# Patient Record
Sex: MALE | Race: WHITE | NOT HISPANIC OR LATINO | Employment: UNEMPLOYED | URBAN - NONMETROPOLITAN AREA
[De-identification: names, ages, dates, MRNs, and addresses within clinical notes are randomized per-mention and may not be internally consistent; named-entity substitution may affect disease eponyms.]

---

## 2019-01-29 ENCOUNTER — OFFICE VISIT (OUTPATIENT)
Dept: CARDIOLOGY | Facility: CLINIC | Age: 49
End: 2019-01-29

## 2019-01-29 ENCOUNTER — RESULTS ENCOUNTER (OUTPATIENT)
Dept: CARDIOLOGY | Facility: CLINIC | Age: 49
End: 2019-01-29

## 2019-01-29 VITALS
BODY MASS INDEX: 32.91 KG/M2 | HEIGHT: 69 IN | SYSTOLIC BLOOD PRESSURE: 154 MMHG | WEIGHT: 222.2 LBS | HEART RATE: 100 BPM | DIASTOLIC BLOOD PRESSURE: 82 MMHG | OXYGEN SATURATION: 96 %

## 2019-01-29 DIAGNOSIS — R06.02 SHORTNESS OF BREATH: ICD-10-CM

## 2019-01-29 DIAGNOSIS — I10 ESSENTIAL HYPERTENSION: Primary | ICD-10-CM

## 2019-01-29 DIAGNOSIS — I10 ESSENTIAL HYPERTENSION: ICD-10-CM

## 2019-01-29 DIAGNOSIS — K74.60 CIRRHOSIS OF LIVER WITHOUT ASCITES, UNSPECIFIED HEPATIC CIRRHOSIS TYPE (HCC): ICD-10-CM

## 2019-01-29 DIAGNOSIS — I95.1 SYNCOPE DUE TO ORTHOSTATIC HYPOTENSION: ICD-10-CM

## 2019-01-29 DIAGNOSIS — R53.83 FATIGUE, UNSPECIFIED TYPE: ICD-10-CM

## 2019-01-29 PROBLEM — N17.9 ACUTE RENAL FAILURE (ARF) (HCC): Status: ACTIVE | Noted: 2019-01-29

## 2019-01-29 PROBLEM — F17.200 SMOKER: Status: ACTIVE | Noted: 2019-01-29

## 2019-01-29 PROCEDURE — 99214 OFFICE O/P EST MOD 30 MIN: CPT | Performed by: INTERNAL MEDICINE

## 2019-01-29 PROCEDURE — 93000 ELECTROCARDIOGRAM COMPLETE: CPT | Performed by: INTERNAL MEDICINE

## 2019-01-29 RX ORDER — OXYCODONE HYDROCHLORIDE 15 MG/1
TABLET ORAL
Refills: 0 | COMMUNITY
Start: 2019-01-03 | End: 2021-06-09

## 2019-01-29 RX ORDER — ISOSORBIDE DINITRATE 20 MG/1
20 TABLET ORAL 2 TIMES DAILY
Refills: 3 | COMMUNITY
Start: 2019-01-03 | End: 2021-09-23

## 2019-01-29 RX ORDER — MELOXICAM 15 MG/1
TABLET ORAL DAILY
Refills: 2 | COMMUNITY
Start: 2018-11-14 | End: 2021-06-09

## 2019-01-29 RX ORDER — FOLIC ACID/MV,IRON,MIN/LUTEIN 0.4-18-25
1 TABLET ORAL DAILY
Refills: 5 | COMMUNITY
Start: 2019-01-18 | End: 2021-06-09

## 2019-01-29 RX ORDER — TAMSULOSIN HYDROCHLORIDE 0.4 MG/1
CAPSULE ORAL DAILY
Refills: 3 | COMMUNITY
Start: 2019-01-18

## 2019-01-29 RX ORDER — MYCOPHENOLATE MOFETIL 500 MG/1
TABLET ORAL
Refills: 11 | COMMUNITY
Start: 2019-01-18

## 2019-01-29 RX ORDER — CYCLOSPORINE 100 MG/1
CAPSULE, GELATIN COATED ORAL 2 TIMES DAILY
COMMUNITY

## 2019-01-29 RX ORDER — AMLODIPINE BESYLATE 10 MG/1
10 TABLET ORAL DAILY
Refills: 3 | COMMUNITY
Start: 2019-01-03 | End: 2021-09-23

## 2019-01-29 RX ORDER — DIAZEPAM 5 MG/1
5 TABLET ORAL 2 TIMES DAILY PRN
Refills: 1 | COMMUNITY
Start: 2019-01-03 | End: 2021-06-09

## 2019-01-29 RX ORDER — PANTOPRAZOLE SODIUM 40 MG/1
40 TABLET, DELAYED RELEASE ORAL 2 TIMES DAILY
Refills: 3 | COMMUNITY
Start: 2019-01-02 | End: 2021-06-09

## 2019-01-29 NOTE — PATIENT INSTRUCTIONS
Obesity, Adult  Obesity is the condition of having too much total body fat. Being overweight or obese means that your weight is greater than what is considered healthy for your body size. Obesity is determined by a measurement called BMI. BMI is an estimate of body fat and is calculated from height and weight. For adults, a BMI of 30 or higher is considered obese.  Obesity can eventually lead to other health concerns and major illnesses, including:  · Stroke.  · Coronary artery disease (CAD).  · Type 2 diabetes.  · Some types of cancer, including cancers of the colon, breast, uterus, and gallbladder.  · Osteoarthritis.  · High blood pressure (hypertension).  · High cholesterol.  · Sleep apnea.  · Gallbladder stones.  · Infertility problems.    What are the causes?  The main cause of obesity is taking in (consuming) more calories than your body uses for energy. Other factors that contribute to this condition may include:  · Being born with genes that make you more likely to become obese.  · Having a medical condition that causes obesity. These conditions include:  ? Hypothyroidism.  ? Polycystic ovarian syndrome (PCOS).  ? Binge-eating disorder.  ? Cushing syndrome.  · Taking certain medicines, such as steroids, antidepressants, and seizure medicines.  · Not being physically active (sedentary lifestyle).  · Living where there are limited places to exercise safely or buy healthy foods.  · Not getting enough sleep.    What increases the risk?  The following factors may increase your risk of this condition:  · Having a family history of obesity.  · Being a woman of -American descent.  · Being a man of  descent.    What are the signs or symptoms?  Having excessive body fat is the main symptom of this condition.  How is this diagnosed?  This condition may be diagnosed based on:  · Your symptoms.  · Your medical history.  · A physical exam. Your health care provider may measure:  ? Your BMI. If you are an  adult with a BMI between 25 and less than 30, you are considered overweight. If you are an adult with a BMI of 30 or higher, you are considered obese.  ? The distances around your hips and your waist (circumferences). These may be compared to each other to help diagnose your condition.  ? Your skinfold thickness. Your health care provider may gently pinch a fold of your skin and measure it.    How is this treated?  Treatment for this condition often includes changing your lifestyle. Treatment may include some or all of the following:  · Dietary changes. Work with your health care provider and a dietitian to set a weight-loss goal that is healthy and reasonable for you. Dietary changes may include eating:  ? Smaller portions. A portion size is the amount of a particular food that is healthy for you to eat at one time. This varies from person to person.  ? Low-calorie or low-fat options.  ? More whole grains, fruits, and vegetables.  · Regular physical activity. This may include aerobic activity (cardio) and strength training.  · Medicine to help you lose weight. Your health care provider may prescribe medicine if you are unable to lose 1 pound a week after 6 weeks of eating more healthily and doing more physical activity.  · Surgery. Surgical options may include gastric banding and gastric bypass. Surgery may be done if:  ? Other treatments have not helped to improve your condition.  ? You have a BMI of 40 or higher.  ? You have life-threatening health problems related to obesity.    Follow these instructions at home:    Eating and drinking    · Follow recommendations from your health care provider about what you eat and drink. Your health care provider may advise you to:  ? Limit fast foods, sweets, and processed snack foods.  ? Choose low-fat options, such as low-fat milk instead of whole milk.  ? Eat 5 or more servings of fruits or vegetables every day.  ? Eat at home more often. This gives you more control over  what you eat.  ? Choose healthy foods when you eat out.  ? Learn what a healthy portion size is.  ? Keep low-fat snacks on hand.  ? Avoid sugary drinks, such as soda, fruit juice, iced tea sweetened with sugar, and flavored milk.  ? Eat a healthy breakfast.  · Drink enough water to keep your urine clear or pale yellow.  · Do not go without eating for long periods of time (do not fast) or follow a fad diet. Fasting and fad diets can be unhealthy and even dangerous.  Physical Activity  · Exercise regularly, as told by your health care provider. Ask your health care provider what types of exercise are safe for you and how often you should exercise.  · Warm up and stretch before being active.  · Cool down and stretch after being active.  · Rest between periods of activity.  Lifestyle  · Limit the time that you spend in front of your TV, computer, or video game system.  · Find ways to reward yourself that do not involve food.  · Limit alcohol intake to no more than 1 drink a day for nonpregnant women and 2 drinks a day for men. One drink equals 12 oz of beer, 5 oz of wine, or 1½ oz of hard liquor.  General instructions  · Keep a weight loss journal to keep track of the food you eat and how much you exercise you get.  · Take over-the-counter and prescription medicines only as told by your health care provider.  · Take vitamins and supplements only as told by your health care provider.  · Consider joining a support group. Your health care provider may be able to recommend a support group.  · Keep all follow-up visits as told by your health care provider. This is important.  Contact a health care provider if:  · You are unable to meet your weight loss goal after 6 weeks of dietary and lifestyle changes.  This information is not intended to replace advice given to you by your health care provider. Make sure you discuss any questions you have with your health care provider.  Document Released: 01/25/2006 Document Revised:  05/22/2017 Document Reviewed: 10/05/2016  [employer] Interactive Patient Education © 2018 [employer]  MyPlate from [employer]  The general, healthful diet is based on the 2010 Dietary Guidelines for Americans. The amount of food you need to eat from each food group depends on your age, sex, and level of physical activity and can be individualized by a dietitian. Go to ChooseMyPlate.gov for more information.  What do I need to know about the MyPlate plan?  · Enjoy your food, but eat less.  · Avoid oversized portions.  ? ½ of your plate should include fruits and vegetables.  ? ¼ of your plate should be grains.  ? ¼ of your plate should be protein.  Grains  · Make at least half of your grains whole grains.  · For a 2,000 calorie daily food plan, eat 6 oz every day.  · 1 oz is about 1 slice bread, 1 cup cereal, or ½ cup cooked rice, cereal, or pasta.  Vegetables  · Make half your plate fruits and vegetables.  · For a 2,000 calorie daily food plan, eat 2½ cups every day.  · 1 cup is about 1 cup raw or cooked vegetables or vegetable juice or 2 cups raw leafy greens.  Fruits  · Make half your plate fruits and vegetables.  · For a 2,000 calorie daily food plan, eat 2 cups every day.  · 1 cup is about 1 cup fruit or 100% fruit juice or ½ cup dried fruit.  Protein  · For a 2,000 calorie daily food plan, eat 5½ oz every day.  · 1 oz is about 1 oz meat, poultry, or fish, ¼ cup cooked beans, 1 egg, 1 Tbsp peanut butter, or ½ oz nuts or seeds.  Dairy  · Switch to fat-free or low-fat (1%) milk.  · For a 2,000 calorie daily food plan, eat 3 cups every day.  · 1 cup is about 1 cup milk or yogurt or soy milk (soy beverage), 1½ oz natural cheese, or 2 oz processed cheese.  Fats, Oils, and Empty Calories  · Only small amounts of oils are recommended.  · Empty calories are calories from solid fats or added sugars.  · Compare sodium in foods like soup, bread, and frozen meals. Choose the foods with lower numbers.  · Drink water instead of  sugary drinks.  What foods can I eat?  Grains  Whole grains such as whole wheat, quinoa, millet, and bulgur. Bread, rolls, and pasta made from whole grains. Brown or wild rice. Hot or cold cereals made from whole grains and without added sugar.  Vegetables  All fresh vegetables, especially fresh red, dark green, or orange vegetables. Peas and beans. Low-sodium frozen or canned vegetables prepared without added salt. Low-sodium vegetable juices.  Fruits  All fresh, frozen, and dried fruits. Canned fruit packed in water or fruit juice without added sugar. Fruit juices without added sugar.  Meats and Other Protein Sources  Boiled, baked, or grilled lean meat trimmed of fat. Skinless poultry. Fresh seafood and shellfish. Canned seafood packed in water. Unsalted nuts and unsalted nut butters. Tofu. Dried beans and pea. Eggs.  Dairy  Low-fat or fat-free milk, yogurt, and cheeses.  Sweets and Desserts  Frozen desserts made from low-fat milk.  Fats and Oils  Olive, peanut, and canola oils and margarine. Salad dressing and mayonnaise made from these oils.  Other  Soups and casseroles made from allowed ingredients and without added fat or salt.  The items listed above may not be a complete list of recommended foods or beverages. Contact your dietitian for more options.  What foods are not recommended?  Grains  Sweetened, low-fiber cereals. Packaged baked goods. Snack crackers and chips. Cheese crackers, butter crackers, and biscuits. Frozen waffles, sweet breads, doughnuts, pastries, packaged baking mixes, pancakes, cakes, and cookies.  Vegetables  Regular canned or frozen vegetables or vegetables prepared with salt. Canned tomatoes. Canned tomato sauce. Fried vegetables. Vegetables in cream sauce or cheese sauce.  Fruits  Fruits packed in syrup or made with added sugar.  Meats and Other Protein Sources  Marbled or fatty meats such as ribs. Poultry with skin. Fried meats, poultry, eggs, or fish. Sausages, hot dogs, and deli  meats such as pastrami, bologna, or salami.  Dairy  Whole milk, cream, cheeses made from whole milk, sour cream. Ice cream or yogurt made from whole milk or with added sugar.  Beverages  For adults, no more than one alcoholic drink per day. Regular soft drinks or other sugary beverages. Juice drinks.  Sweets and Desserts  Sugary or fatty desserts, candy, and other sweets.  Fats and Oils  Solid shortening or partially hydrogenated oils. Solid margarine. Margarine that contains trans fats. Butter.  The items listed above may not be a complete list of foods and beverages to avoid. Contact your dietitian for more information.  This information is not intended to replace advice given to you by your health care provider. Make sure you discuss any questions you have with your health care provider.  Document Released: 01/06/2009 Document Revised: 05/25/2017 Document Reviewed: 11/26/2014  [employer] Interactive Patient Education © 2018 [employer]  For more information:    Quit Now [place]  1-800-QUIT-NOW  https://[place].quitlogix.org/en-[place]/  Steps to Quit Smoking  Smoking tobacco can be harmful to your health and can affect almost every organ in your body. Smoking puts you, and those around you, at risk for developing many serious chronic diseases. Quitting smoking is difficult, but it is one of the best things that you can do for your health. It is never too late to quit.  What are the benefits of quitting smoking?  When you quit smoking, you lower your risk of developing serious diseases and conditions, such as:  · Lung cancer or lung disease, such as COPD.  · Heart disease.  · Stroke.  · Heart attack.  · Infertility.  · Osteoporosis and bone fractures.  Additionally, symptoms such as coughing, wheezing, and shortness of breath may get better when you quit. You may also find that you get sick less often because your body is stronger at fighting off colds and infections. If you are pregnant, quitting smoking can help to  reduce your chances of having a baby of low birth weight.  How do I get ready to quit?  When you decide to quit smoking, create a plan to make sure that you are successful. Before you quit:  · Pick a date to quit. Set a date within the next two weeks to give you time to prepare.  · Write down the reasons why you are quitting. Keep this list in places where you will see it often, such as on your bathroom mirror or in your car or wallet.  · Identify the people, places, things, and activities that make you want to smoke (triggers) and avoid them. Make sure to take these actions:  ¨ Throw away all cigarettes at home, at work, and in your car.  ¨ Throw away smoking accessories, such as ashtrays and lighters.  ¨ Clean your car and make sure to empty the ashtray.  ¨ Clean your home, including curtains and carpets.  · Tell your family, friends, and coworkers that you are quitting. Support from your loved ones can make quitting easier.  · Talk with your health care provider about your options for quitting smoking.  · Find out what treatment options are covered by your health insurance.  What strategies can I use to quit smoking?  Talk with your healthcare provider about different strategies to quit smoking. Some strategies include:  · Quitting smoking altogether instead of gradually lessening how much you smoke over a period of time. Research shows that quitting “cold turkey” is more successful than gradually quitting.  · Attending in-person counseling to help you build problem-solving skills. You are more likely to have success in quitting if you attend several counseling sessions. Even short sessions of 10 minutes can be effective.  · Finding resources and support systems that can help you to quit smoking and remain smoke-free after you quit. These resources are most helpful when you use them often. They can include:  ¨ Online chats with a counselor.  ¨ Telephone quitlines.  ¨ Printed self-help materials.  ¨ Support groups  or group counseling.  ¨ Text messaging programs.  ¨ Mobile phone applications.  · Taking medicines to help you quit smoking. (If you are pregnant or breastfeeding, talk with your health care provider first.) Some medicines contain nicotine and some do not. Both types of medicines help with cravings, but the medicines that include nicotine help to relieve withdrawal symptoms. Your health care provider may recommend:  ¨ Nicotine patches, gum, or lozenges.  ¨ Nicotine inhalers or sprays.  ¨ Non-nicotine medicine that is taken by mouth.  Talk with your health care provider about combining strategies, such as taking medicines while you are also receiving in-person counseling. Using these two strategies together makes you more likely to succeed in quitting than if you used either strategy on its own.  If you are pregnant or breastfeeding, talk with your health care provider about finding counseling or other support strategies to quit smoking. Do not take medicine to help you quit smoking unless told to do so by your health care provider.  What things can I do to make it easier to quit?  Quitting smoking might feel overwhelming at first, but there is a lot that you can do to make it easier. Take these important actions:  · Reach out to your family and friends and ask that they support and encourage you during this time. Call telephone quitlines, reach out to support groups, or work with a counselor for support.  · Ask people who smoke to avoid smoking around you.  · Avoid places that trigger you to smoke, such as bars, parties, or smoke-break areas at work.  · Spend time around people who do not smoke.  · Lessen stress in your life, because stress can be a smoking trigger for some people. To lessen stress, try:  ¨ Exercising regularly.  ¨ Deep-breathing exercises.  ¨ Yoga.  ¨ Meditating.  ¨ Performing a body scan. This involves closing your eyes, scanning your body from head to toe, and noticing which parts of your body  are particularly tense. Purposefully relax the muscles in those areas.  · Download or purchase mobile phone or tablet apps (applications) that can help you stick to your quit plan by providing reminders, tips, and encouragement. There are many free apps, such as QuitGuide from the CDC (Centers for Disease Control and Prevention). You can find other support for quitting smoking (smoking cessation) through smokefree.gov and other websites.  How will I feel when I quit smoking?  Within the first 24 hours of quitting smoking, you may start to feel some withdrawal symptoms. These symptoms are usually most noticeable 2-3 days after quitting, but they usually do not last beyond 2-3 weeks. Changes or symptoms that you might experience include:  · Mood swings.  · Restlessness, anxiety, or irritation.  · Difficulty concentrating.  · Dizziness.  · Strong cravings for sugary foods in addition to nicotine.  · Mild weight gain.  · Constipation.  · Nausea.  · Coughing or a sore throat.  · Changes in how your medicines work in your body.  · A depressed mood.  · Difficulty sleeping (insomnia).  After the first 2-3 weeks of quitting, you may start to notice more positive results, such as:  · Improved sense of smell and taste.  · Decreased coughing and sore throat.  · Slower heart rate.  · Lower blood pressure.  · Clearer skin.  · The ability to breathe more easily.  · Fewer sick days.  Quitting smoking is very challenging for most people. Do not get discouraged if you are not successful the first time. Some people need to make many attempts to quit before they achieve long-term success. Do your best to stick to your quit plan, and talk with your health care provider if you have any questions or concerns.  This information is not intended to replace advice given to you by your health care provider. Make sure you discuss any questions you have with your health care provider.  Document Released: 12/12/2002 Document Revised: 08/15/2017  Document Reviewed: 05/03/2016  [employer] Interactive Patient Education © 2017 [employer]

## 2019-04-10 ENCOUNTER — OUTSIDE FACILITY SERVICE (OUTPATIENT)
Dept: CARDIOLOGY | Facility: CLINIC | Age: 49
End: 2019-04-10

## 2019-04-10 PROCEDURE — 93018 CV STRESS TEST I&R ONLY: CPT | Performed by: INTERNAL MEDICINE

## 2021-04-09 ENCOUNTER — TELEPHONE (OUTPATIENT)
Dept: CARDIOLOGY | Facility: CLINIC | Age: 51
End: 2021-04-09

## 2021-04-09 NOTE — TELEPHONE ENCOUNTER
PT CALLED IN TO RESCHEDULE HIS APPT. INFORMED PT THAT HE HAS NO SHOWED OR CANCELLED THE LAST 6 APPT. INFORMED PT THAT HE WOULD NEED TO MAKE THIS APPT OR HE COULD GET DISMISSED. PT STATED THAT HE UNDERSTOOD AND HE WOULD MAKE THIS APPT.  [facility]

## 2021-06-09 ENCOUNTER — OFFICE VISIT (OUTPATIENT)
Dept: CARDIOLOGY | Facility: CLINIC | Age: 51
End: 2021-06-09

## 2021-06-09 VITALS
DIASTOLIC BLOOD PRESSURE: 72 MMHG | TEMPERATURE: 97.8 F | HEIGHT: 69 IN | SYSTOLIC BLOOD PRESSURE: 119 MMHG | BODY MASS INDEX: 30.36 KG/M2 | OXYGEN SATURATION: 95 % | WEIGHT: 205 LBS | HEART RATE: 116 BPM

## 2021-06-09 DIAGNOSIS — R06.02 SHORTNESS OF BREATH: ICD-10-CM

## 2021-06-09 DIAGNOSIS — R42 DIZZINESS: ICD-10-CM

## 2021-06-09 DIAGNOSIS — I95.1 SYNCOPE DUE TO ORTHOSTATIC HYPOTENSION: ICD-10-CM

## 2021-06-09 DIAGNOSIS — Z86.16 HISTORY OF COVID-19: ICD-10-CM

## 2021-06-09 DIAGNOSIS — R00.2 PALPITATIONS: ICD-10-CM

## 2021-06-09 DIAGNOSIS — F17.200 SMOKER: ICD-10-CM

## 2021-06-09 DIAGNOSIS — I10 ESSENTIAL HYPERTENSION: Primary | ICD-10-CM

## 2021-06-09 PROBLEM — J30.2 SEASONAL ALLERGIES: Status: ACTIVE | Noted: 2021-06-09

## 2021-06-09 PROBLEM — E11.9 DM (DIABETES MELLITUS), TYPE 2 (HCC): Status: ACTIVE | Noted: 2021-06-09

## 2021-06-09 PROBLEM — J44.9 COPD (CHRONIC OBSTRUCTIVE PULMONARY DISEASE) (HCC): Status: ACTIVE | Noted: 2021-06-09

## 2021-06-09 PROBLEM — J84.10 PULMONARY FIBROSIS (HCC): Status: ACTIVE | Noted: 2021-06-09

## 2021-06-09 PROCEDURE — 99213 OFFICE O/P EST LOW 20 MIN: CPT | Performed by: NURSE PRACTITIONER

## 2021-06-09 RX ORDER — ENTECAVIR 1 MG/1
1 TABLET, FILM COATED ORAL DAILY
COMMUNITY

## 2021-06-09 RX ORDER — FLUOXETINE HYDROCHLORIDE 20 MG/1
20 CAPSULE ORAL DAILY
COMMUNITY
End: 2021-09-23

## 2021-06-09 RX ORDER — BUPRENORPHINE HYDROCHLORIDE AND NALOXONE HYDROCHLORIDE DIHYDRATE 8; 2 MG/1; MG/1
1 TABLET SUBLINGUAL 2 TIMES DAILY
COMMUNITY

## 2021-06-09 RX ORDER — SODIUM BICARBONATE 650 MG/1
650 TABLET ORAL 2 TIMES DAILY
COMMUNITY

## 2021-06-09 NOTE — PROGRESS NOTES
Subjective   John Mcconnell is a 51 y.o. male     Chief Complaint   Patient presents with   • [facility]    PROBLEM LIST:     1. HTN  2. Shortness of Breath  2.1 Echo 2021-EF greater than 52%, no regional wall motion abnormalities, trace TR, mild AR, the ED E ratio is most consistent with a normal left atrial pressure, mild MR  3. Cirrhosis of the liver  3.1 S/P liver transplant in   4. Acute renal failure, in , at   5. Syncope and collapse due to ortho. Hypotension  6. Chronic smoker  7.  History of COVID-19 2020 he also had another of coronavirus in 2018.  8.  Pulmonary fibrosis/COPD, Dr. Chu  9.  Diabetes mellitus type 2 diagnosed in 2019, diet controlled  10.  Chronic kidney disease, Dr. Conner or Dr. Gasca    Patient is a 51-year-old male who presents today for a follow-up.  He denies any chest pain or pressure.  He says whenever he is walking get short of breath his heart will race.  He says he has dizziness and lightheadedness when he gets up too fast.  He denies any presyncope, syncope, orthopnea or PND.  He says the only time he had any swelling in his legs was when he had Covid back in December.  He says he does get short of breath with any activity that he does any has to stop and rest.  He says it got worse after having Covid as well as his fatigue.  Patient did used to see Dr. Sharif and had a stress test a couple of years ago       Occupation: Disabled  Smokes 1 PPD for 34-35 yrs; no ETOH/Illicit drugs  Spr[employer] 6/day; no coffee or tea    Mom 75  - pneumonia, afib  Dad (Matt Mcconnell) 79 alive - CAD, Afib, carotid artery disease   Paternal Grandfather 95  - heart problems    Current Outpatient Medications on File Prior to Visit   Medication Sig Dispense Refill   • amLODIPine (NORVASC) 10 MG tablet Take 10 mg by mouth Daily.  3   • buprenorphine-naloxone (SUBOXONE) 8-2 MG per SL tablet Place 1 tablet under the tongue 2 (two) times a day.     •  cycloSPORINE (sandIMMUNE) 100 MG capsule Take  by mouth 2 (two) times a day. Due to liver transplant      • entecavir (BARACLUDE) 1 MG tablet Take 1 mg by mouth Daily. Every 72 hours     • FLUoxetine (PROzac) 20 MG capsule Take 20 mg by mouth Daily.     • Fluticasone-Umeclidin-Vilant (Trelegy Ellipta) 200-62.5-25 MCG/INH inhaler Inhale 1 puff Daily.     • isosorbide dinitrate (ISORDIL) 20 MG tablet Take 20 mg by mouth 2 (Two) Times a Day.  3   • sodium bicarbonate 650 MG tablet Take 650 mg by mouth 2 (Two) Times a Day.     • tamsulosin (FLOMAX) 0.4 MG capsule 24 hr capsule Daily.  3   • mycophenolate (CELLCEPT) 500 MG tablet TAKE 2 TABLET S  TWICE DAILY  11   • [DISCONTINUED] aspirin 325 MG EC tablet Take 325 mg by mouth Daily.  1   • [DISCONTINUED] Cholecalciferol (VITAMIN D3) 5000 units capsule capsule TAKE 1 CAPSULE ONCE DAILY 6 DAYS A WEEK  2   • [DISCONTINUED] diazePAM (VALIUM) 5 MG tablet Take 5 mg by mouth 2 (Two) Times a Day As Needed. for anxiety  1   • [DISCONTINUED] meloxicam (MOBIC) 15 MG tablet Daily.  2   • [DISCONTINUED] Multiple Vitamins-Minerals (CERTAVITE/ANTIOXIDANTS) tablet Take 1 tablet by mouth Daily.  5   • [DISCONTINUED] oxyCODONE (ROXICODONE) 15 MG immediate release tablet TAKE ONE TABLET BY MOUTH 3 TO 4 TIMES DAILY AS NEEDED FOR PAIN  0   • [DISCONTINUED] pantoprazole (PROTONIX) 40 MG EC tablet Take 40 mg by mouth 2 (Two) Times a Day.  3     No current facility-administered medications on file prior to visit.       ALLERGIES    Tylenol [acetaminophen] and Morphine    Past Medical History:   Diagnosis Date   • Acute kidney failure (CMS/HCC)     medication related   • Cirrhosis of liver (CMS/HCC)    • COVID-19 12/2020   • COVID-19 vaccine administered 1st - 04/17/2021 05/18/2021 - Moderna    • Hypertension    • Liver failure (CMS/HCC)    • Seizures (CMS/HCC)    • Syncope and collapse        Social History     Socioeconomic History   • Marital status: Single     Spouse name: Not on file   •  Number of children: Not on file   • Years of education: Not on file   • Highest education level: Not on file   Tobacco Use   • Smoking status: Current Every Day Smoker     Packs/day: 1.00     Years: 35.00     Pack years: 35.00     Types: Cigarettes   • Smokeless tobacco: Never Used   • Tobacco comment: smokes approx. 1.5 PPD since age 15   Substance and Sexual Activity   • Alcohol use: No   • Drug use: No       Family History   Problem Relation Age of Onset   • Atrial fibrillation Mother    • Pneumonia Mother    • Stroke Mother    • Hypertension Father    • Hyperlipidemia Father        Review of Systems   Constitutional: Positive for appetite change (decreased appetite) and fatigue (tired alot ). Negative for chills, diaphoresis and fever.   HENT: Negative for congestion, rhinorrhea and sore throat.    Eyes: Negative for visual disturbance.   Respiratory: Positive for cough (due to smoking and allergies ) and shortness of breath (walking at any distance he stops to rest; with any activity, worse after COVID). Negative for chest tightness and wheezing.    Cardiovascular: Positive for palpitations (heart races; when he walks) and leg swelling (back in dec with COVID ). Negative for chest pain.   Gastrointestinal: Positive for nausea (nausea worse in the morning ). Negative for abdominal pain, blood in stool, constipation, diarrhea and vomiting.   Endocrine: Negative for cold intolerance and heat intolerance.   Genitourinary: Positive for dysuria (burning now he is seeing a Kidney Dr ) and frequency (several times in the night 3-4 times ). Negative for difficulty urinating, hematuria and urgency.   Musculoskeletal: Positive for arthralgias (all over his body , worse in his back , knees ,legs and shoulders ), back pain (lower ) and neck stiffness (stiff neck when he lays in a position to long ). Negative for joint swelling and neck pain.   Skin: Negative for color change, pallor, rash and wound.   Allergic/Immunologic:  "Negative for environmental allergies and food allergies.   Neurological: Positive for dizziness (at times when he gets up fast; bend over; just on occasion ), weakness (legs ) and light-headedness (when he gets up to fast ). Negative for numbness and headaches.   Hematological: Bruises/bleeds easily (bruises ).   Psychiatric/Behavioral: Positive for sleep disturbance (hard to stay asleep and hard to go back to sleep when he gets up to use the restroom ).       Objective   /72 (BP Location: Left arm)   Pulse 116   Temp 97.8 °F (36.6 °C)   Ht 175.3 cm (69\")   Wt 93 kg (205 lb)   SpO2 95%   BMI 30.27 kg/m²   Vitals:    06/09/21 1115   BP: 119/72   BP Location: Left arm   Pulse: 116   Temp: 97.8 °F (36.6 °C)   SpO2: 95%   Weight: 93 kg (205 lb)   Height: 175.3 cm (69\")      Lab Results (most recent)     None        Physical Exam  Vitals reviewed.   Constitutional:       General: He is awake.      Appearance: Normal appearance. He is well-developed and well-groomed. He is obese.   HENT:      Head: Normocephalic.   Eyes:      General: Lids are normal.      Comments: Left eye amblyopia    Neck:      Vascular: No carotid bruit, hepatojugular reflux or JVD.   Cardiovascular:      Rate and Rhythm: Regular rhythm. Tachycardia present.      Pulses:           Radial pulses are 2+ on the right side and 2+ on the left side.        Dorsalis pedis pulses are 2+ on the right side and 2+ on the left side.        Posterior tibial pulses are 2+ on the right side and 2+ on the left side.      Heart sounds: Normal heart sounds.   Pulmonary:      Effort: Pulmonary effort is normal.      Breath sounds: Normal air entry. Examination of the right-lower field reveals decreased breath sounds. Examination of the left-lower field reveals decreased breath sounds. Decreased breath sounds present.   Abdominal:      General: Bowel sounds are normal.      Palpations: Abdomen is soft.   Musculoskeletal:      Right knee: Deformity present. " Tenderness present.      Right lower leg: No edema.      Left lower leg: Edema (trace) present.   Skin:     General: Skin is warm and dry.   Neurological:      Mental Status: He is alert and oriented to person, place, and time.   Psychiatric:         Attention and Perception: Attention and perception normal.         Mood and Affect: Mood and affect normal.         Speech: Speech normal.         Behavior: Behavior normal. Behavior is cooperative.         Thought Content: Thought content normal.         Cognition and Memory: Cognition and memory normal.         Judgment: Judgment normal.         Procedure   Procedures         Assessment/Plan      Diagnosis Plan   1. Essential hypertension     2. Syncope due to orthostatic hypotension     3. Shortness of breath     4. Smoker     5. History of COVID-19     6. Palpitations  Cardiac Event Monitor   7. Dizziness  Cardiac Event Monitor       Return in about 12 weeks (around 9/1/2021).    Hypertension-patient is on amlodipine and doing well.  Syncope due to orthostatic hypotension-stable.  Shortness of breath-stable.  Smoker-encourage cessation.  History of COVID-19-patient had echocardiogram in May and EF was within normal limits.  Palpitations/dizziness-patient wore an event monitor for 2 weeks.  Patient feels since he had a stress test as a couple years ago that nothing has changed and no reason for him to have one at this time.  He will continue his medication regimen for now.  He will follow-up in 12 weeks or sooner if any changes or abnormalities with testing.       John Jayesh  reports that he has been smoking cigarettes. He has a 35.00 pack-year smoking history. He has never used smokeless tobacco..     Electronically signed by:

## 2021-09-23 ENCOUNTER — OFFICE VISIT (OUTPATIENT)
Dept: CARDIOLOGY | Facility: CLINIC | Age: 51
End: 2021-09-23

## 2021-09-23 VITALS
BODY MASS INDEX: 27.99 KG/M2 | TEMPERATURE: 98 F | WEIGHT: 189 LBS | HEIGHT: 69 IN | DIASTOLIC BLOOD PRESSURE: 63 MMHG | HEART RATE: 90 BPM | SYSTOLIC BLOOD PRESSURE: 113 MMHG | OXYGEN SATURATION: 98 %

## 2021-09-23 DIAGNOSIS — R06.02 SHORTNESS OF BREATH: ICD-10-CM

## 2021-09-23 DIAGNOSIS — Z86.16 HISTORY OF COVID-19: ICD-10-CM

## 2021-09-23 DIAGNOSIS — F17.200 SMOKER: ICD-10-CM

## 2021-09-23 DIAGNOSIS — I10 ESSENTIAL HYPERTENSION: Primary | ICD-10-CM

## 2021-09-23 PROCEDURE — 99213 OFFICE O/P EST LOW 20 MIN: CPT | Performed by: NURSE PRACTITIONER

## 2021-09-23 NOTE — PROGRESS NOTES
Subjective   John Mcconnell is a 51 y.o. male     Chief Complaint   Patient presents with   • Follow-up   • Hypertension       HPI    PROBLEM LIST:     1. HTN  2. Shortness of Breath  2.1 Echo 5/29/2021-EF greater than 52%, no regional wall motion abnormalities, trace TR, mild AR, the ED E ratio is most consistent with a normal left atrial pressure, mild MR  3. Cirrhosis of the liver  3.1 S/P liver transplant in 2011  4. Acute renal failure, in 2014, at   5. Syncope and collapse due to ortho. Hypotension  6. Chronic smoker  7.  History of COVID-19 12/2020 he also had another of coronavirus in 2018.  8.  Pulmonary fibrosis/COPD, Dr. Chu  9.  Diabetes mellitus type 2 diagnosed in 2019, diet controlled  10.  Chronic kidney disease, Dr. Conner or Dr. Gasca  11. Moderna Vaccination 4/2021; 5/2021; booster 9/10/2021  12.  ERCP with stent 9/2021,     Patient is a 51-year-old male who presents today for follow-up.  He denies any chest pain, pressure, palpitations, fluttering, dizziness, presyncope, syncope, orthopnea, PND or edema.  He denies any shortness of breath with activity.  He says that he was having abdominal pain and ended up going to .  He had a stent placed he says in the liver.  I do have some of those records from  and he did have ERCP.  Patient is currently very jaundiced still.  He says he has follow-up next week.  He did receive his booster while he was there for the Covid vaccine.  Patient did not wear a monitor due to all the issues he has been having and being in and out of the hospital.  He says however since receiving his stent his heart rate has been doing much better and does not feel like it is necessary.  We will return the monitor for him.  Patient still smokes a pack a day.    Current Outpatient Medications on File Prior to Visit   Medication Sig Dispense Refill   • buprenorphine-naloxone (SUBOXONE) 8-2 MG per SL tablet Place 1 tablet under the tongue 2 (two) times a day.     •  cycloSPORINE (sandIMMUNE) 100 MG capsule Take  by mouth 2 (two) times a day. Due to liver transplant      • entecavir (BARACLUDE) 1 MG tablet Take 1 mg by mouth Daily. Every 72 hours     • Fluticasone-Umeclidin-Vilant (Trelegy Ellipta) 200-62.5-25 MCG/INH inhaler Inhale 1 puff Daily.     • mycophenolate (CELLCEPT) 500 MG tablet TAKE 2 TABLET S  TWICE DAILY  11   • sodium bicarbonate 650 MG tablet Take 650 mg by mouth 2 (Two) Times a Day.     • tamsulosin (FLOMAX) 0.4 MG capsule 24 hr capsule Daily.  3   • [DISCONTINUED] FLUoxetine (PROzac) 20 MG capsule Take 20 mg by mouth Daily.     • [DISCONTINUED] amLODIPine (NORVASC) 10 MG tablet Take 10 mg by mouth Daily.  3   • [DISCONTINUED] isosorbide dinitrate (ISORDIL) 20 MG tablet Take 20 mg by mouth 2 (Two) Times a Day.  3     No current facility-administered medications on file prior to visit.       ALLERGIES    Tylenol [acetaminophen] and Morphine    Past Medical History:   Diagnosis Date   • Acute kidney failure (CMS/HCC)     medication related   • Cirrhosis of liver (CMS/HCC)    • COVID-19 12/2020   • COVID-19 vaccine administered 1st - 04/17/2021 05/18/2021 - Moderna ( Booster 09/2021 )   • Hypertension    • Liver failure (CMS/HCC)    • Seizures (CMS/HCC)    • Syncope and collapse        Social History     Socioeconomic History   • Marital status: Single     Spouse name: Not on file   • Number of children: Not on file   • Years of education: Not on file   • Highest education level: Not on file   Tobacco Use   • Smoking status: Current Every Day Smoker     Packs/day: 1.00     Years: 35.00     Pack years: 35.00     Types: Cigarettes   • Smokeless tobacco: Never Used   • Tobacco comment: smokes approx. 1.5 PPD since age 15   Substance and Sexual Activity   • Alcohol use: No   • Drug use: No       Family History   Problem Relation Age of Onset   • Atrial fibrillation Mother    • Pneumonia Mother    • Stroke Mother    • Hypertension Father    • Hyperlipidemia Father   "      Review of Systems   Constitutional: Positive for appetite change (decreased appetite) and fatigue (sever fatigue ). Negative for chills and fever.   HENT: Negative for congestion, rhinorrhea and sore throat.    Eyes: Negative for visual disturbance.   Respiratory: Negative for cough, chest tightness, shortness of breath and wheezing.    Cardiovascular: Negative for chest pain, palpitations and leg swelling.   Gastrointestinal: Negative for abdominal pain, blood in stool, constipation, diarrhea, nausea and vomiting.   Endocrine: Negative for cold intolerance and heat intolerance.   Genitourinary: Positive for frequency (several times at night ). Negative for difficulty urinating, dysuria, hematuria and urgency.   Musculoskeletal: Positive for arthralgias (trhough out the entire body ), back pain (lower ), joint swelling (right knee) and neck stiffness (stiffiness at times ). Negative for neck pain.   Skin: Negative for color change, pallor, rash and wound.   Allergic/Immunologic: Negative for environmental allergies and food allergies.   Neurological: Positive for weakness (his entire body ), numbness (feet at times ) and headaches (above his right eye ). Negative for dizziness and light-headedness.   Hematological: Bruises/bleeds easily (Bruises ).   Psychiatric/Behavioral: Positive for sleep disturbance (pt states he has hard time staying asleep he is up and down all night ).       Objective   /63 (BP Location: Left arm)   Pulse 90   Temp 98 °F (36.7 °C)   Ht 175.3 cm (69\")   Wt 85.7 kg (189 lb)   SpO2 98%   BMI 27.91 kg/m²   Vitals:    09/23/21 0948   BP: 113/63   BP Location: Left arm   Pulse: 90   Temp: 98 °F (36.7 °C)   SpO2: 98%   Weight: 85.7 kg (189 lb)   Height: 175.3 cm (69\")      Lab Results (most recent)     None        Physical Exam  Vitals reviewed.   Constitutional:       General: He is awake.      Appearance: Normal appearance. He is well-developed, well-groomed and overweight. "   HENT:      Head: Normocephalic.   Eyes:      General: Lids are normal. Scleral icterus present.      Comments: Left eye amblyopia    Neck:      Vascular: No carotid bruit, hepatojugular reflux or JVD.   Cardiovascular:      Rate and Rhythm: Normal rate and regular rhythm.      Pulses:           Radial pulses are 2+ on the right side and 2+ on the left side.        Dorsalis pedis pulses are 2+ on the right side and 2+ on the left side.        Posterior tibial pulses are 2+ on the right side and 2+ on the left side.      Heart sounds: Normal heart sounds.   Pulmonary:      Effort: Pulmonary effort is normal.      Breath sounds: Normal breath sounds and air entry.   Abdominal:      General: Bowel sounds are normal.      Palpations: Abdomen is soft.   Musculoskeletal:      Right lower leg: No edema.      Left lower leg: No edema.   Skin:     General: Skin is warm and dry.      Coloration: Skin is jaundiced.   Neurological:      Mental Status: He is alert and oriented to person, place, and time.   Psychiatric:         Attention and Perception: Attention and perception normal.         Mood and Affect: Mood and affect normal.         Speech: Speech normal.         Behavior: Behavior normal. Behavior is cooperative.         Thought Content: Thought content normal.         Cognition and Memory: Cognition and memory normal.         Judgment: Judgment normal.         Procedure   Procedures         Assessment/Plan      Diagnosis Plan   1. Essential hypertension     2. Shortness of breath     3. History of COVID-19     4. Smoker         Return in about 6 months (around 3/23/2022).    Hypertension-his medications were discontinued when he was in the hospital for his ERCP.  His blood pressure is doing well.  Shortness of breath-resolved.  History of COVID-19-EF is stable and is doing well.  Smoker-encourage cessation.  He will continue his medication regimen.  He will follow-up in 6 months or sooner if any changes.       John  Salomesvetlana  reports that he has been smoking cigarettes. He has a 35.00 pack-year smoking history. He has never used smokeless tobacco.. Patient did not bring med list or medicine bottles to appointment, med list has been reviewed and updated based on patient's knowledge of their meds.     Electronically signed by: